# Patient Record
Sex: FEMALE | Race: OTHER | NOT HISPANIC OR LATINO
[De-identification: names, ages, dates, MRNs, and addresses within clinical notes are randomized per-mention and may not be internally consistent; named-entity substitution may affect disease eponyms.]

---

## 2019-03-27 ENCOUNTER — APPOINTMENT (OUTPATIENT)
Dept: BARIATRICS | Facility: CLINIC | Age: 34
End: 2019-03-27
Payer: MEDICAID

## 2019-03-27 VITALS
SYSTOLIC BLOOD PRESSURE: 149 MMHG | BODY MASS INDEX: 43.4 KG/M2 | HEART RATE: 84 BPM | HEIGHT: 69 IN | OXYGEN SATURATION: 97 % | TEMPERATURE: 97.5 F | WEIGHT: 293 LBS | DIASTOLIC BLOOD PRESSURE: 93 MMHG

## 2019-03-27 PROCEDURE — 99203 OFFICE O/P NEW LOW 30 MIN: CPT

## 2019-03-31 NOTE — PHYSICAL EXAM
[Obese, well nourished, in no acute distress] : obese, well nourished, in no acute distress [Normal] : affect appropriate [de-identified] : central obesity

## 2019-03-31 NOTE — HISTORY OF PRESENT ILLNESS
[de-identified] : Patient is a 33 year old female with a long history of morbid obesity not responsive to multiple dietary regimens. Her BMI is 61.6, and her weight related comorbidities include hypertension and GERD.

## 2019-03-31 NOTE — ASSESSMENT
[FreeTextEntry1] : She meets the NIH criteria for bariatric surgery. She would like to have a laparoscopic modified duodenal switch. I have reviewed the risks and benefits of the procedure with the patient, and she understands this information fully.

## 2019-04-17 ENCOUNTER — APPOINTMENT (OUTPATIENT)
Dept: BARIATRICS | Facility: CLINIC | Age: 34
End: 2019-04-17

## 2019-10-16 ENCOUNTER — OUTPATIENT (OUTPATIENT)
Dept: OUTPATIENT SERVICES | Age: 34
LOS: 1 days | Discharge: HOME | End: 2019-10-16

## 2019-10-17 ENCOUNTER — APPOINTMENT (OUTPATIENT)
Dept: PEDIATRIC CARDIOLOGY | Facility: CLINIC | Age: 34
End: 2019-10-17

## 2020-07-02 ENCOUNTER — APPOINTMENT (OUTPATIENT)
Dept: BARIATRICS | Facility: CLINIC | Age: 35
End: 2020-07-02

## 2020-07-16 ENCOUNTER — APPOINTMENT (OUTPATIENT)
Dept: BARIATRICS | Facility: CLINIC | Age: 35
End: 2020-07-16

## 2020-07-29 ENCOUNTER — APPOINTMENT (OUTPATIENT)
Dept: BARIATRICS | Facility: CLINIC | Age: 35
End: 2020-07-29
Payer: COMMERCIAL

## 2020-07-29 VITALS — HEIGHT: 69 IN | WEIGHT: 293 LBS | BODY MASS INDEX: 43.4 KG/M2

## 2020-07-29 PROCEDURE — 99214 OFFICE O/P EST MOD 30 MIN: CPT | Mod: 95

## 2020-08-30 NOTE — REVIEW OF SYSTEMS
[Abdominal Pain] : no abdominal pain [Vomiting] : no vomiting [Constipation] : no constipation [Diarrhea] : no diarrhea [Reflux/Heartburn] : reflux/heartburn [Hernia] : no hernia [Negative] : Endocrine

## 2020-08-30 NOTE — ASSESSMENT
[FreeTextEntry1] : She meets the [facility] criteria for bariatric surgery and would like to have a laparoscopic modified duodenal switch. I have reviewed the risks and benefits of the procedure with patient, and she understands this information fully.

## 2020-08-30 NOTE — HISTORY OF PRESENT ILLNESS
[Home] : at home, [unfilled] , at the time of the visit. [Medical Office: ([facility])___] : at the medical office located in  [Verbal consent obtained from patient] : the patient, [unfilled] [de-identified] : Patient is a 34 year old female with along history of morbid obesity not responsive to multiple dietary regimens. She has a BMI of 62 and weight-related comorbidities including hypertension and GERD.

## 2020-09-03 ENCOUNTER — APPOINTMENT (OUTPATIENT)
Dept: BARIATRICS | Facility: CLINIC | Age: 35
End: 2020-09-03
Payer: COMMERCIAL

## 2020-09-03 VITALS — WEIGHT: 293 LBS | HEIGHT: 69 IN | BODY MASS INDEX: 43.4 KG/M2

## 2020-09-03 PROCEDURE — 97802 MEDICAL NUTRITION INDIV IN: CPT

## 2020-10-02 ENCOUNTER — APPOINTMENT (OUTPATIENT)
Dept: BARIATRICS | Facility: CLINIC | Age: 35
End: 2020-10-02
Payer: COMMERCIAL

## 2020-10-02 VITALS — WEIGHT: 293 LBS | HEIGHT: 69 IN | BODY MASS INDEX: 43.4 KG/M2

## 2020-10-02 PROCEDURE — 97803 MED NUTRITION INDIV SUBSEQ: CPT

## 2020-10-14 ENCOUNTER — RESULT REVIEW (OUTPATIENT)
Age: 35
End: 2020-10-14

## 2020-10-14 ENCOUNTER — APPOINTMENT (OUTPATIENT)
Dept: RADIOLOGY | Facility: HOSPITAL | Age: 35
End: 2020-10-14

## 2020-10-14 ENCOUNTER — OUTPATIENT (OUTPATIENT)
Dept: OUTPATIENT SERVICES | Facility: HOSPITAL | Age: 35
LOS: 1 days | End: 2020-10-14
Payer: COMMERCIAL

## 2020-10-14 PROCEDURE — 74240 X-RAY XM UPR GI TRC 1CNTRST: CPT

## 2020-10-14 PROCEDURE — 74240 X-RAY XM UPR GI TRC 1CNTRST: CPT | Mod: 26

## 2020-11-19 ENCOUNTER — APPOINTMENT (OUTPATIENT)
Dept: BARIATRICS | Facility: CLINIC | Age: 35
End: 2020-11-19
Payer: COMMERCIAL

## 2020-11-19 VITALS — BODY MASS INDEX: 60.84 KG/M2 | WEIGHT: 293 LBS

## 2020-11-19 DIAGNOSIS — I10 ESSENTIAL (PRIMARY) HYPERTENSION: ICD-10-CM

## 2020-11-19 PROCEDURE — 97803 MED NUTRITION INDIV SUBSEQ: CPT

## 2020-12-03 ENCOUNTER — TRANSCRIPTION ENCOUNTER (OUTPATIENT)
Age: 35
End: 2020-12-03

## 2021-01-06 ENCOUNTER — APPOINTMENT (OUTPATIENT)
Dept: BARIATRICS | Facility: CLINIC | Age: 36
End: 2021-01-06
Payer: COMMERCIAL

## 2021-01-06 VITALS — WEIGHT: 293 LBS | BODY MASS INDEX: 60.84 KG/M2

## 2021-01-06 PROCEDURE — 99442: CPT | Mod: 95

## 2021-01-08 ENCOUNTER — LABORATORY RESULT (OUTPATIENT)
Age: 36
End: 2021-01-08

## 2021-01-08 VITALS
RESPIRATION RATE: 16 BRPM | HEIGHT: 71 IN | TEMPERATURE: 98 F | OXYGEN SATURATION: 97 % | DIASTOLIC BLOOD PRESSURE: 82 MMHG | WEIGHT: 293 LBS | HEART RATE: 73 BPM | SYSTOLIC BLOOD PRESSURE: 141 MMHG

## 2021-01-08 NOTE — PRE-OP CHECKLIST - SELECT TESTS ORDERED
CBC/CMP/PT/PTT/INR/Urinalysis/EKG/CXR covid negative, 1/8, Urine HCG negative/CBC/CMP/PT/PTT/INR/Urinalysis/EKG/CXR

## 2021-01-10 ENCOUNTER — TRANSCRIPTION ENCOUNTER (OUTPATIENT)
Age: 36
End: 2021-01-10

## 2021-01-11 ENCOUNTER — RESULT REVIEW (OUTPATIENT)
Age: 36
End: 2021-01-11

## 2021-01-11 ENCOUNTER — INPATIENT (INPATIENT)
Facility: HOSPITAL | Age: 36
LOS: 1 days | Discharge: HOME | DRG: 621 | End: 2021-01-13
Attending: SURGERY | Admitting: SURGERY
Payer: COMMERCIAL

## 2021-01-11 ENCOUNTER — APPOINTMENT (OUTPATIENT)
Dept: BARIATRICS | Facility: HOSPITAL | Age: 36
End: 2021-01-11

## 2021-01-11 DIAGNOSIS — Z98.891 HISTORY OF UTERINE SCAR FROM PREVIOUS SURGERY: Chronic | ICD-10-CM

## 2021-01-11 DIAGNOSIS — Z41.9 ENCOUNTER FOR PROCEDURE FOR PURPOSES OTHER THAN REMEDYING HEALTH STATE, UNSPECIFIED: Chronic | ICD-10-CM

## 2021-01-11 LAB
BASOPHILS # BLD AUTO: 0.01 K/UL — SIGNIFICANT CHANGE UP (ref 0–0.2)
BASOPHILS NFR BLD AUTO: 0.1 % — SIGNIFICANT CHANGE UP (ref 0–2)
BLD GP AB SCN SERPL QL: NEGATIVE — SIGNIFICANT CHANGE UP
EOSINOPHIL # BLD AUTO: 0 K/UL — SIGNIFICANT CHANGE UP (ref 0–0.5)
EOSINOPHIL NFR BLD AUTO: 0 % — SIGNIFICANT CHANGE UP (ref 0–6)
HCT VFR BLD CALC: 40.1 % — SIGNIFICANT CHANGE UP (ref 34.5–45)
HGB BLD-MCNC: 12.1 G/DL — SIGNIFICANT CHANGE UP (ref 11.5–15.5)
IMM GRANULOCYTES NFR BLD AUTO: 0.3 % — SIGNIFICANT CHANGE UP (ref 0–1.5)
LYMPHOCYTES # BLD AUTO: 0.55 K/UL — LOW (ref 1–3.3)
LYMPHOCYTES # BLD AUTO: 4.2 % — LOW (ref 13–44)
MCHC RBC-ENTMCNC: 24.2 PG — LOW (ref 27–34)
MCHC RBC-ENTMCNC: 30.2 GM/DL — LOW (ref 32–36)
MCV RBC AUTO: 80 FL — SIGNIFICANT CHANGE UP (ref 80–100)
MONOCYTES # BLD AUTO: 0.29 K/UL — SIGNIFICANT CHANGE UP (ref 0–0.9)
MONOCYTES NFR BLD AUTO: 2.2 % — SIGNIFICANT CHANGE UP (ref 2–14)
NEUTROPHILS # BLD AUTO: 12.33 K/UL — HIGH (ref 1.8–7.4)
NEUTROPHILS NFR BLD AUTO: 93.2 % — HIGH (ref 43–77)
NRBC # BLD: 0 /100 WBCS — SIGNIFICANT CHANGE UP (ref 0–0)
PLATELET # BLD AUTO: 319 K/UL — SIGNIFICANT CHANGE UP (ref 150–400)
RBC # BLD: 5.01 M/UL — SIGNIFICANT CHANGE UP (ref 3.8–5.2)
RBC # FLD: 15.3 % — HIGH (ref 10.3–14.5)
RH IG SCN BLD-IMP: POSITIVE — SIGNIFICANT CHANGE UP
WBC # BLD: 13.22 K/UL — HIGH (ref 3.8–10.5)
WBC # FLD AUTO: 13.22 K/UL — HIGH (ref 3.8–10.5)

## 2021-01-11 PROCEDURE — 88307 TISSUE EXAM BY PATHOLOGIST: CPT | Mod: 26

## 2021-01-11 PROCEDURE — 43845 GASTROPLASTY DUODENAL SWITCH: CPT | Mod: GC

## 2021-01-11 RX ORDER — LABETALOL HCL 100 MG
200 TABLET ORAL
Refills: 0 | Status: DISCONTINUED | OUTPATIENT
Start: 2021-01-11 | End: 2021-01-13

## 2021-01-11 RX ORDER — ACETAMINOPHEN 500 MG
1000 TABLET ORAL ONCE
Refills: 0 | Status: COMPLETED | OUTPATIENT
Start: 2021-01-11 | End: 2021-01-11

## 2021-01-11 RX ORDER — HYDROMORPHONE HYDROCHLORIDE 2 MG/ML
0.5 INJECTION INTRAMUSCULAR; INTRAVENOUS; SUBCUTANEOUS
Refills: 0 | Status: DISCONTINUED | OUTPATIENT
Start: 2021-01-11 | End: 2021-01-11

## 2021-01-11 RX ORDER — PANTOPRAZOLE SODIUM 20 MG/1
40 TABLET, DELAYED RELEASE ORAL ONCE
Refills: 0 | Status: COMPLETED | OUTPATIENT
Start: 2021-01-11 | End: 2021-01-11

## 2021-01-11 RX ORDER — TERIFLUNOMIDE 7 MG/1
0 TABLET, FILM COATED ORAL
Qty: 0 | Refills: 0 | DISCHARGE

## 2021-01-11 RX ORDER — SCOPALAMINE 1 MG/3D
1 PATCH, EXTENDED RELEASE TRANSDERMAL ONCE
Refills: 0 | Status: COMPLETED | OUTPATIENT
Start: 2021-01-11 | End: 2021-01-11

## 2021-01-11 RX ORDER — ACETAMINOPHEN 500 MG
650 TABLET ORAL EVERY 6 HOURS
Refills: 0 | Status: DISCONTINUED | OUTPATIENT
Start: 2021-01-11 | End: 2021-01-13

## 2021-01-11 RX ORDER — PANTOPRAZOLE SODIUM 20 MG/1
40 TABLET, DELAYED RELEASE ORAL DAILY
Refills: 0 | Status: DISCONTINUED | OUTPATIENT
Start: 2021-01-11 | End: 2021-01-13

## 2021-01-11 RX ORDER — TERIFLUNOMIDE 7 MG/1
1 TABLET, FILM COATED ORAL
Qty: 0 | Refills: 0 | DISCHARGE

## 2021-01-11 RX ORDER — BUPIVACAINE 13.3 MG/ML
20 INJECTION, SUSPENSION, LIPOSOMAL INFILTRATION ONCE
Refills: 0 | Status: DISCONTINUED | OUTPATIENT
Start: 2021-01-11 | End: 2021-01-11

## 2021-01-11 RX ORDER — SODIUM CHLORIDE 9 MG/ML
1000 INJECTION, SOLUTION INTRAVENOUS
Refills: 0 | Status: DISCONTINUED | OUTPATIENT
Start: 2021-01-11 | End: 2021-01-13

## 2021-01-11 RX ORDER — ONDANSETRON 8 MG/1
4 TABLET, FILM COATED ORAL EVERY 6 HOURS
Refills: 0 | Status: DISCONTINUED | OUTPATIENT
Start: 2021-01-11 | End: 2021-01-13

## 2021-01-11 RX ORDER — KETOROLAC TROMETHAMINE 30 MG/ML
15 SYRINGE (ML) INJECTION EVERY 6 HOURS
Refills: 0 | Status: DISCONTINUED | OUTPATIENT
Start: 2021-01-11 | End: 2021-01-11

## 2021-01-11 RX ORDER — GABAPENTIN 400 MG/1
300 CAPSULE ORAL ONCE
Refills: 0 | Status: COMPLETED | OUTPATIENT
Start: 2021-01-11 | End: 2021-01-11

## 2021-01-11 RX ORDER — KETOROLAC TROMETHAMINE 30 MG/ML
15 SYRINGE (ML) INJECTION EVERY 6 HOURS
Refills: 0 | Status: DISCONTINUED | OUTPATIENT
Start: 2021-01-11 | End: 2021-01-13

## 2021-01-11 RX ORDER — ENOXAPARIN SODIUM 100 MG/ML
40 INJECTION SUBCUTANEOUS ONCE
Refills: 0 | Status: COMPLETED | OUTPATIENT
Start: 2021-01-11 | End: 2021-01-11

## 2021-01-11 RX ORDER — ACETAMINOPHEN 500 MG
650 TABLET ORAL EVERY 6 HOURS
Refills: 0 | Status: DISCONTINUED | OUTPATIENT
Start: 2021-01-11 | End: 2021-01-11

## 2021-01-11 RX ORDER — ACETAMINOPHEN 500 MG
1000 TABLET ORAL ONCE
Refills: 0 | Status: COMPLETED | OUTPATIENT
Start: 2021-01-11 | End: 2021-01-12

## 2021-01-11 RX ADMIN — Medication 200 MILLIGRAM(S): at 21:07

## 2021-01-11 RX ADMIN — SCOPALAMINE 1 PATCH: 1 PATCH, EXTENDED RELEASE TRANSDERMAL at 06:55

## 2021-01-11 RX ADMIN — GABAPENTIN 300 MILLIGRAM(S): 400 CAPSULE ORAL at 06:55

## 2021-01-11 RX ADMIN — HYDROMORPHONE HYDROCHLORIDE 0.5 MILLIGRAM(S): 2 INJECTION INTRAMUSCULAR; INTRAVENOUS; SUBCUTANEOUS at 14:07

## 2021-01-11 RX ADMIN — ONDANSETRON 4 MILLIGRAM(S): 8 TABLET, FILM COATED ORAL at 16:50

## 2021-01-11 RX ADMIN — PANTOPRAZOLE SODIUM 40 MILLIGRAM(S): 20 TABLET, DELAYED RELEASE ORAL at 12:12

## 2021-01-11 RX ADMIN — PANTOPRAZOLE SODIUM 40 MILLIGRAM(S): 20 TABLET, DELAYED RELEASE ORAL at 18:49

## 2021-01-11 RX ADMIN — HYDROMORPHONE HYDROCHLORIDE 0.5 MILLIGRAM(S): 2 INJECTION INTRAMUSCULAR; INTRAVENOUS; SUBCUTANEOUS at 22:38

## 2021-01-11 RX ADMIN — Medication 400 MILLIGRAM(S): at 12:15

## 2021-01-11 RX ADMIN — ENOXAPARIN SODIUM 40 MILLIGRAM(S): 100 INJECTION SUBCUTANEOUS at 06:55

## 2021-01-11 RX ADMIN — Medication 1000 MILLIGRAM(S): at 06:55

## 2021-01-11 RX ADMIN — Medication 15 MILLIGRAM(S): at 21:07

## 2021-01-11 RX ADMIN — Medication 400 MILLIGRAM(S): at 19:29

## 2021-01-11 RX ADMIN — SCOPALAMINE 1 PATCH: 1 PATCH, EXTENDED RELEASE TRANSDERMAL at 20:15

## 2021-01-11 NOTE — H&P ADULT - ASSESSMENT
35F with PMH of MO (BMI 62), GERD, HTN, MS, PSH of  x2 presents for elective DS.    Plan  NPO for OR  Post-op bariatric protocol

## 2021-01-11 NOTE — H&P ADULT - HISTORY OF PRESENT ILLNESS
35F with PMH of MO (BMI 62), GERD, HTN, MS, PSH of  x2 presents for elective DS. Pt failed to lose weight with lifestyle and dietary measures. Denies recent travel or sick contacts.

## 2021-01-11 NOTE — BRIEF OPERATIVE NOTE - OPERATION/FINDINGS
Stomach divided along Bougie edge using EndoGIA stapler. Omentum sutured to greater curvature of sleeved stomach. Enterotomies made in duodenal stump & intestine using monopolar scissors. Posterior half of anastomosis sutured laparoscopically. After which anterior half of anastomosis completed with 2-0 PDS. Methylene blue leak test negative for extravasation. Hemostasis achieved. Fascia at 15mm post site closed. Port sites closed w/ 2-0 Vicryl & 4-0 Monocryl sutures & surgical glue.

## 2021-01-11 NOTE — H&P ADULT - NSHPPHYSICALEXAM_GEN_ALL_CORE
GEN: NAD, awake, eyes open spontaneously, generous body habitus  HEENT: NCAT, MMM, Trachea midline, normal conjunctiva, perrl  CHEST/LUNGS: Nonlaboured breathing, CTAB, bilateral breath sounds  CARDIAC: RRR, no m/r/g  ABDOMEN: Soft, Nontender, Nondistended, No rebound/guarding  MSK: No oedema, no gross deformity of extremities  SKIN: No rashes, no petechiae, no vesicles  NEURO: CN grossly intact, normal coordination, no focal motor or sensory deficits  PSYCH: Alert, appropriate, cooperative, with capacity and insight

## 2021-01-12 ENCOUNTER — TRANSCRIPTION ENCOUNTER (OUTPATIENT)
Age: 36
End: 2021-01-12

## 2021-01-12 LAB
ANION GAP SERPL CALC-SCNC: 13 MMOL/L — SIGNIFICANT CHANGE UP (ref 5–17)
BASOPHILS # BLD AUTO: 0.02 K/UL — SIGNIFICANT CHANGE UP (ref 0–0.2)
BASOPHILS NFR BLD AUTO: 0.2 % — SIGNIFICANT CHANGE UP (ref 0–2)
BUN SERPL-MCNC: 11 MG/DL — SIGNIFICANT CHANGE UP (ref 7–23)
CALCIUM SERPL-MCNC: 8.7 MG/DL — SIGNIFICANT CHANGE UP (ref 8.4–10.5)
CHLORIDE SERPL-SCNC: 102 MMOL/L — SIGNIFICANT CHANGE UP (ref 96–108)
CO2 SERPL-SCNC: 24 MMOL/L — SIGNIFICANT CHANGE UP (ref 22–31)
CREAT SERPL-MCNC: 0.94 MG/DL — SIGNIFICANT CHANGE UP (ref 0.5–1.3)
EOSINOPHIL # BLD AUTO: 0 K/UL — SIGNIFICANT CHANGE UP (ref 0–0.5)
EOSINOPHIL NFR BLD AUTO: 0 % — SIGNIFICANT CHANGE UP (ref 0–6)
GLUCOSE SERPL-MCNC: 125 MG/DL — HIGH (ref 70–99)
HCT VFR BLD CALC: 35.7 % — SIGNIFICANT CHANGE UP (ref 34.5–45)
HGB BLD-MCNC: 10.7 G/DL — LOW (ref 11.5–15.5)
IMM GRANULOCYTES NFR BLD AUTO: 0.3 % — SIGNIFICANT CHANGE UP (ref 0–1.5)
LYMPHOCYTES # BLD AUTO: 0.91 K/UL — LOW (ref 1–3.3)
LYMPHOCYTES # BLD AUTO: 10.6 % — LOW (ref 13–44)
MAGNESIUM SERPL-MCNC: 2.1 MG/DL — SIGNIFICANT CHANGE UP (ref 1.6–2.6)
MCHC RBC-ENTMCNC: 23.6 PG — LOW (ref 27–34)
MCHC RBC-ENTMCNC: 30 GM/DL — LOW (ref 32–36)
MCV RBC AUTO: 78.8 FL — LOW (ref 80–100)
MONOCYTES # BLD AUTO: 0.69 K/UL — SIGNIFICANT CHANGE UP (ref 0–0.9)
MONOCYTES NFR BLD AUTO: 8 % — SIGNIFICANT CHANGE UP (ref 2–14)
NEUTROPHILS # BLD AUTO: 6.96 K/UL — SIGNIFICANT CHANGE UP (ref 1.8–7.4)
NEUTROPHILS NFR BLD AUTO: 80.9 % — HIGH (ref 43–77)
NRBC # BLD: 0 /100 WBCS — SIGNIFICANT CHANGE UP (ref 0–0)
PHOSPHATE SERPL-MCNC: 2.8 MG/DL — SIGNIFICANT CHANGE UP (ref 2.5–4.5)
PLATELET # BLD AUTO: 274 K/UL — SIGNIFICANT CHANGE UP (ref 150–400)
POTASSIUM SERPL-MCNC: 3.5 MMOL/L — SIGNIFICANT CHANGE UP (ref 3.5–5.3)
POTASSIUM SERPL-SCNC: 3.5 MMOL/L — SIGNIFICANT CHANGE UP (ref 3.5–5.3)
RBC # BLD: 4.53 M/UL — SIGNIFICANT CHANGE UP (ref 3.8–5.2)
RBC # FLD: 15.4 % — HIGH (ref 10.3–14.5)
SODIUM SERPL-SCNC: 139 MMOL/L — SIGNIFICANT CHANGE UP (ref 135–145)
WBC # BLD: 8.61 K/UL — SIGNIFICANT CHANGE UP (ref 3.8–10.5)
WBC # FLD AUTO: 8.61 K/UL — SIGNIFICANT CHANGE UP (ref 3.8–10.5)

## 2021-01-12 RX ORDER — ACETAMINOPHEN 500 MG
2 TABLET ORAL
Qty: 28 | Refills: 0
Start: 2021-01-12 | End: 2021-01-18

## 2021-01-12 RX ORDER — OMEPRAZOLE 10 MG/1
1 CAPSULE, DELAYED RELEASE ORAL
Qty: 30 | Refills: 0
Start: 2021-01-12 | End: 2021-02-10

## 2021-01-12 RX ORDER — LABETALOL HCL 100 MG
1 TABLET ORAL
Qty: 0 | Refills: 0 | DISCHARGE

## 2021-01-12 RX ORDER — SODIUM CHLORIDE 9 MG/ML
500 INJECTION, SOLUTION INTRAVENOUS ONCE
Refills: 0 | Status: COMPLETED | OUTPATIENT
Start: 2021-01-12 | End: 2021-01-12

## 2021-01-12 RX ORDER — HYDROMORPHONE HYDROCHLORIDE 2 MG/ML
0.25 INJECTION INTRAMUSCULAR; INTRAVENOUS; SUBCUTANEOUS ONCE
Refills: 0 | Status: DISCONTINUED | OUTPATIENT
Start: 2021-01-12 | End: 2021-01-12

## 2021-01-12 RX ORDER — HEPARIN SODIUM 5000 [USP'U]/ML
5000 INJECTION INTRAVENOUS; SUBCUTANEOUS EVERY 8 HOURS
Refills: 0 | Status: DISCONTINUED | OUTPATIENT
Start: 2021-01-12 | End: 2021-01-13

## 2021-01-12 RX ORDER — HYDROCHLOROTHIAZIDE 25 MG
25 TABLET ORAL
Qty: 0 | Refills: 0 | DISCHARGE

## 2021-01-12 RX ORDER — SODIUM CHLORIDE 9 MG/ML
1000 INJECTION, SOLUTION INTRAVENOUS ONCE
Refills: 0 | Status: COMPLETED | OUTPATIENT
Start: 2021-01-12 | End: 2021-01-12

## 2021-01-12 RX ORDER — POTASSIUM CHLORIDE 20 MEQ
40 PACKET (EA) ORAL ONCE
Refills: 0 | Status: COMPLETED | OUTPATIENT
Start: 2021-01-12 | End: 2021-01-12

## 2021-01-12 RX ORDER — ACETAMINOPHEN 500 MG
1000 TABLET ORAL ONCE
Refills: 0 | Status: DISCONTINUED | OUTPATIENT
Start: 2021-01-12 | End: 2021-01-13

## 2021-01-12 RX ORDER — APIXABAN 2.5 MG/1
1 TABLET, FILM COATED ORAL
Qty: 60 | Refills: 0
Start: 2021-01-12 | End: 2021-02-10

## 2021-01-12 RX ADMIN — HEPARIN SODIUM 5000 UNIT(S): 5000 INJECTION INTRAVENOUS; SUBCUTANEOUS at 22:06

## 2021-01-12 RX ADMIN — Medication 200 MILLIGRAM(S): at 17:47

## 2021-01-12 RX ADMIN — Medication 15 MILLIGRAM(S): at 22:06

## 2021-01-12 RX ADMIN — Medication 15 MILLIGRAM(S): at 05:17

## 2021-01-12 RX ADMIN — SCOPALAMINE 1 PATCH: 1 PATCH, EXTENDED RELEASE TRANSDERMAL at 06:07

## 2021-01-12 RX ADMIN — SCOPALAMINE 1 PATCH: 1 PATCH, EXTENDED RELEASE TRANSDERMAL at 19:00

## 2021-01-12 RX ADMIN — Medication 400 MILLIGRAM(S): at 05:17

## 2021-01-12 RX ADMIN — Medication 40 MILLIEQUIVALENT(S): at 10:13

## 2021-01-12 RX ADMIN — HYDROMORPHONE HYDROCHLORIDE 0.25 MILLIGRAM(S): 2 INJECTION INTRAMUSCULAR; INTRAVENOUS; SUBCUTANEOUS at 10:10

## 2021-01-12 RX ADMIN — HEPARIN SODIUM 5000 UNIT(S): 5000 INJECTION INTRAVENOUS; SUBCUTANEOUS at 14:36

## 2021-01-12 RX ADMIN — ONDANSETRON 4 MILLIGRAM(S): 8 TABLET, FILM COATED ORAL at 10:16

## 2021-01-12 RX ADMIN — ONDANSETRON 4 MILLIGRAM(S): 8 TABLET, FILM COATED ORAL at 04:49

## 2021-01-12 RX ADMIN — Medication 15 MILLIGRAM(S): at 12:52

## 2021-01-12 RX ADMIN — SODIUM CHLORIDE 500 MILLILITER(S): 9 INJECTION, SOLUTION INTRAVENOUS at 14:35

## 2021-01-12 RX ADMIN — PANTOPRAZOLE SODIUM 40 MILLIGRAM(S): 20 TABLET, DELAYED RELEASE ORAL at 12:51

## 2021-01-12 RX ADMIN — SODIUM CHLORIDE 1000 MILLILITER(S): 9 INJECTION, SOLUTION INTRAVENOUS at 07:45

## 2021-01-12 RX ADMIN — Medication 200 MILLIGRAM(S): at 05:17

## 2021-01-12 RX ADMIN — Medication 15 MILLIGRAM(S): at 17:45

## 2021-01-12 NOTE — DISCHARGE NOTE PROVIDER - NSDCCPTREATMENT_GEN_ALL_CORE_FT
PRINCIPAL PROCEDURE  Procedure: Laparoscopic loop duodenal switch  Findings and Treatment: Warning Signs:  Please call your doctor or nurse practitioner if you experience the following:  *You experience new chest pain, pressure, squeezing or tightness.  *New or worsening cough, shortness of breath, or wheeze.  *If you are vomiting and cannot keep down fluids or your medications.  *You are getting dehydrated due to continued vomiting, diarrhea, or other reasons. Signs of dehydration include dry mouth, rapid heartbeat, or feeling dizzy or faint when standing.  *You see blood or dark/black material when you vomit or have a bowel movement.  *You experience burning when you urinate, have blood in your urine, or experience a discharge.  *Your pain is not improving within 8-12 hours or is not gone within 24 hours. Call or return immediately if your pain is getting worse, changes location, or moves to your chest or back.  *You have shaking chills, or fever greater than 101.5 degrees Fahrenheit or 38 degrees Celsius.  *Any change in your symptoms, or any new symptoms that concern you.

## 2021-01-12 NOTE — DISCHARGE NOTE PROVIDER - HOSPITAL COURSE
36 yo F w/ PMH of MO (BMI 62), GERD, HTN, MS, PSH of  x2 presents for elective DS. The patient failed to lose weight with lifestyle and dietary measures. Denies recent travel or sick contacts.    : Laparoscopic loop duodenal switch, the patient tolerated the procedure well    Since the surgery the patient was slowly advanced to BCLD, which she tolerated w/o nausea or vomiting.    On the day of discharge the patient was having adequate bowel function and urination and was stable to be discharged home.   35 year old female with PMH of MO (BMI 62), GERD, HTN, MS, pshx  of  x2  s/p Laparoscopic loop duodenal switch 21.  Pt tolerated the procedure well. At time of discharge, pt was tolerating a bariatric clear liquid diet, and pt's pain was controlled. Plan is to follow up with Dr. Billy in the office.

## 2021-01-12 NOTE — DISCHARGE NOTE PROVIDER - NSDCMRMEDTOKEN_GEN_ALL_CORE_FT
Aubagio 14 mg oral tablet: 1 tab(s) orally once a day  Eliquis 2.5 mg oral tablet: 1 tab(s) orally 2 times a day   omeprazole 40 mg oral delayed release capsule: 1 cap(s) orally once a day   Tylenol 325 mg oral tablet: 2 tab(s) orally 2 times a day, As Needed -for severe pain

## 2021-01-12 NOTE — PROGRESS NOTE ADULT - ASSESSMENT
35F with PMH of MO (BMI 62), GERD, HTN, MS, PSH of  x2 presents for elective DS. Pt failed to lose weight with lifestyle and dietary measures. Denies recent travel or sick contacts. Now s/p Laparoscopic Loop Duodenal Switch ().    BCLD/IVF  Pain and Nausea control  Protonix  CBC 6 hours post-op  OOBA/IS  SCDs  Nutrition consult in AM  encourage PO intake, poss dc this afternoon

## 2021-01-12 NOTE — DIETITIAN INITIAL EVALUATION ADULT. - OTHER CALCULATIONS
Ideal body weight (70.3kg) used for calculations as pt >120% of IBW (263%). Nutrient needs based on [facility] standards of care for maintenance in adults. Needs adjusted for s/p duodenal switch. Weeks 1-2 estimated nutrient needs above, >/=64oz clear fluids. Estimated energy needs for wt maintenance 1406-1757kcal/day (20-25kcal/kg)

## 2021-01-12 NOTE — DISCHARGE NOTE PROVIDER - NSDCFUADDINST_GEN_ALL_CORE_FT
Follow up with  in 1 week. Call the office at  to schedule your appointment.     You may shower; soap and water over incision sites. Do not scrub. Pat dry when done. No tub bathing or swimming until cleared.     Keep incision sites out of the sun as scars will darken.     No heavy lifting (>10lbs) or strenuous exercise.    Diet: Bariatric Full Fluids. 60 grams protein daily.  64 fluid ounces water daily. Drink small sips throughout the day. Continue diet as outlined by paperwork received as a pre-operative patient.     You should be urinating at least 3-4x per day.     Call the office if you experience increasing abdominal pain, nausea, vomiting, or temperature >100.4F.      NO ASPIRIN OR NSAIDs until approved by Dr. Billy.     Avoid alcoholic beverages until cleared by Dr. iBlly.  Follow up with  in 1 week. Call the office at  to schedule your appointment.  You may shower; soap and water over incision sites. Do not scrub. Pat dry when done. No tub bathing or swimming until cleared. Keep incision sites out of the sun as scars will darken. No heavy lifting (>10lbs) or strenuous exercise. Diet: Bariatric Full Fluids. 60 grams protein daily.  64 fluid ounces water daily. Drink small sips throughout the day. Continue diet as outlined by paperwork received as a pre-operative patient. You should be urinating at least 3-4x per day. Call the office if you experience increasing abdominal pain, nausea, vomiting, or temperature >100.4F.  NO ASPIRIN OR NSAIDs until approved by Dr. Billy. Avoid alcoholic beverages until cleared by Dr. Billy.    1) Please take Tylenol 650 mg every 4 to 6 hours by mouth for moderate to severe pain control. Please do not exceed over 4,000 mg of Tylenol a day.   2) Please take Omeprazole 40 mg once a day by mouth.  3) Please start taking Eliquis 2.5 mg by mouth twice a day starting Thursday January 14, 2021 for 28 days.

## 2021-01-12 NOTE — DIETITIAN INITIAL EVALUATION ADULT. - OTHER INFO
35F with PMH of MO (BMI 62), GERD, HTN, MS, PSH of  x2 presents for elective DS. Now POD1 s/p Laparoscopic Loop Duodenal Switch (). Pt seen in room, resting in bed. Currently on BARICLLIQ diet and tolerating PO. Pt just started consuming 1oz fluids every 15 minutes. Discussed volume sizes of cups of tray table. Pt reports nausea and vomiting has improved. Pt noted to be ambulating per EMR. Prepared w/ appropriate protein/vitamin supplements except V B12. RD provided in-depth edu on diet advancement process and specific nutrient needs s/p DS. Pt receptive and expressed understanding. Allergy to shellfish noted in EMR. Skin: surgical incision; GI: abdominal discomfort per flowsheet. RD to follow.

## 2021-01-12 NOTE — DISCHARGE NOTE PROVIDER - NSDCCPCAREPLAN_GEN_ALL_CORE_FT
PRINCIPAL DISCHARGE DIAGNOSIS  Diagnosis: Morbid obesity  Assessment and Plan of Treatment:       SECONDARY DISCHARGE DIAGNOSES  Diagnosis: H/O:   Assessment and Plan of Treatment: x2    Diagnosis: Multiple sclerosis  Assessment and Plan of Treatment:     Diagnosis: HTN (hypertension)  Assessment and Plan of Treatment:     Diagnosis: GERD (gastroesophageal reflux disease)  Assessment and Plan of Treatment:      PRINCIPAL DISCHARGE DIAGNOSIS  Diagnosis: Morbid obesity  Assessment and Plan of Treatment: 35 year old female with PMH of MO (BMI 62), GERD, HTN, MS, pshx  of  x2  s/p Laparoscopic loop duodenal switch 21.  Pt tolerated the procedure well. At time of discharge, pt was tolerating a bariatric clear liquid diet, and pt's pain was controlled. Plan is to follow up with Dr. Billy in the office.  1) Please take Tylenol 650 mg every 4 to 6 hours by mouth for moderate to severe pain control. Please do not exceed over 4,000 mg of Tylenol a day.   2) Please take Omeprazole 40 mg once a day by mouth.  3) Please start taking Eliquis 2.5 mg by mouth twice a day starting  for 28 days.        SECONDARY DISCHARGE DIAGNOSES  Diagnosis: H/O:   Assessment and Plan of Treatment: x2    Diagnosis: Multiple sclerosis  Assessment and Plan of Treatment:     Diagnosis: HTN (hypertension)  Assessment and Plan of Treatment:     Diagnosis: GERD (gastroesophageal reflux disease)  Assessment and Plan of Treatment:

## 2021-01-12 NOTE — DIETITIAN INITIAL EVALUATION ADULT. - PERSON TAUGHT/METHOD
Educated on bariatric diet progression, importance of adequate protein, hydration, and compliance with vitamin supplements. Pt expressed positive, verbal understanding; expected compliance is good./verbal instruction/written material/patient instructed

## 2021-01-12 NOTE — DISCHARGE NOTE PROVIDER - NSDCACTIVITY_GEN_ALL_CORE
Showering allowed/Walking - Indoors allowed/No heavy lifting/straining/Walking - Outdoors allowed Showering allowed/Stairs allowed/Walking - Indoors allowed/No heavy lifting/straining/Walking - Outdoors allowed

## 2021-01-13 ENCOUNTER — TRANSCRIPTION ENCOUNTER (OUTPATIENT)
Age: 36
End: 2021-01-13

## 2021-01-13 VITALS
DIASTOLIC BLOOD PRESSURE: 75 MMHG | OXYGEN SATURATION: 96 % | TEMPERATURE: 99 F | HEART RATE: 71 BPM | RESPIRATION RATE: 16 BRPM | SYSTOLIC BLOOD PRESSURE: 129 MMHG

## 2021-01-13 LAB
ANION GAP SERPL CALC-SCNC: 11 MMOL/L — SIGNIFICANT CHANGE UP (ref 5–17)
BUN SERPL-MCNC: 9 MG/DL — SIGNIFICANT CHANGE UP (ref 7–23)
CALCIUM SERPL-MCNC: 8.4 MG/DL — SIGNIFICANT CHANGE UP (ref 8.4–10.5)
CHLORIDE SERPL-SCNC: 102 MMOL/L — SIGNIFICANT CHANGE UP (ref 96–108)
CO2 SERPL-SCNC: 25 MMOL/L — SIGNIFICANT CHANGE UP (ref 22–31)
CREAT SERPL-MCNC: 0.92 MG/DL — SIGNIFICANT CHANGE UP (ref 0.5–1.3)
GLUCOSE SERPL-MCNC: 106 MG/DL — HIGH (ref 70–99)
HCT VFR BLD CALC: 33.6 % — LOW (ref 34.5–45)
HGB BLD-MCNC: 10.1 G/DL — LOW (ref 11.5–15.5)
MAGNESIUM SERPL-MCNC: 2 MG/DL — SIGNIFICANT CHANGE UP (ref 1.6–2.6)
MCHC RBC-ENTMCNC: 23.9 PG — LOW (ref 27–34)
MCHC RBC-ENTMCNC: 30.1 GM/DL — LOW (ref 32–36)
MCV RBC AUTO: 79.4 FL — LOW (ref 80–100)
NRBC # BLD: 0 /100 WBCS — SIGNIFICANT CHANGE UP (ref 0–0)
PHOSPHATE SERPL-MCNC: 2 MG/DL — LOW (ref 2.5–4.5)
PLATELET # BLD AUTO: 241 K/UL — SIGNIFICANT CHANGE UP (ref 150–400)
POTASSIUM SERPL-MCNC: 3.5 MMOL/L — SIGNIFICANT CHANGE UP (ref 3.5–5.3)
POTASSIUM SERPL-SCNC: 3.5 MMOL/L — SIGNIFICANT CHANGE UP (ref 3.5–5.3)
RBC # BLD: 4.23 M/UL — SIGNIFICANT CHANGE UP (ref 3.8–5.2)
RBC # FLD: 15.6 % — HIGH (ref 10.3–14.5)
SODIUM SERPL-SCNC: 138 MMOL/L — SIGNIFICANT CHANGE UP (ref 135–145)
SURGICAL PATHOLOGY STUDY: SIGNIFICANT CHANGE UP
WBC # BLD: 6.73 K/UL — SIGNIFICANT CHANGE UP (ref 3.8–10.5)
WBC # FLD AUTO: 6.73 K/UL — SIGNIFICANT CHANGE UP (ref 3.8–10.5)

## 2021-01-13 PROCEDURE — 80048 BASIC METABOLIC PNL TOTAL CA: CPT

## 2021-01-13 PROCEDURE — 84100 ASSAY OF PHOSPHORUS: CPT

## 2021-01-13 PROCEDURE — C1889: CPT

## 2021-01-13 PROCEDURE — 83735 ASSAY OF MAGNESIUM: CPT

## 2021-01-13 PROCEDURE — 86901 BLOOD TYPING SEROLOGIC RH(D): CPT

## 2021-01-13 PROCEDURE — 85027 COMPLETE CBC AUTOMATED: CPT

## 2021-01-13 PROCEDURE — 36415 COLL VENOUS BLD VENIPUNCTURE: CPT

## 2021-01-13 PROCEDURE — 86850 RBC ANTIBODY SCREEN: CPT

## 2021-01-13 PROCEDURE — S2900: CPT

## 2021-01-13 PROCEDURE — 85025 COMPLETE CBC W/AUTO DIFF WBC: CPT

## 2021-01-13 PROCEDURE — 88307 TISSUE EXAM BY PATHOLOGIST: CPT

## 2021-01-13 PROCEDURE — 86900 BLOOD TYPING SEROLOGIC ABO: CPT

## 2021-01-13 RX ORDER — POTASSIUM CHLORIDE 20 MEQ
40 PACKET (EA) ORAL ONCE
Refills: 0 | Status: COMPLETED | OUTPATIENT
Start: 2021-01-13 | End: 2021-01-13

## 2021-01-13 RX ORDER — SODIUM,POTASSIUM PHOSPHATES 278-250MG
1 POWDER IN PACKET (EA) ORAL ONCE
Refills: 0 | Status: COMPLETED | OUTPATIENT
Start: 2021-01-13 | End: 2021-01-13

## 2021-01-13 RX ADMIN — Medication 1 PACKET(S): at 11:42

## 2021-01-13 RX ADMIN — Medication 15 MILLIGRAM(S): at 11:43

## 2021-01-13 RX ADMIN — SCOPALAMINE 1 PATCH: 1 PATCH, EXTENDED RELEASE TRANSDERMAL at 07:22

## 2021-01-13 RX ADMIN — Medication 15 MILLIGRAM(S): at 05:04

## 2021-01-13 RX ADMIN — HEPARIN SODIUM 5000 UNIT(S): 5000 INJECTION INTRAVENOUS; SUBCUTANEOUS at 05:04

## 2021-01-13 RX ADMIN — Medication 40 MILLIEQUIVALENT(S): at 09:00

## 2021-01-13 RX ADMIN — PANTOPRAZOLE SODIUM 40 MILLIGRAM(S): 20 TABLET, DELAYED RELEASE ORAL at 11:43

## 2021-01-13 RX ADMIN — Medication 200 MILLIGRAM(S): at 05:04

## 2021-01-13 NOTE — PROGRESS NOTE ADULT - ASSESSMENT
35F with PMH of MO (BMI 62), GERD, HTN, MS, PSH of  x2 presents for elective DS. Pt failed to lose weight with lifestyle and dietary measures. Denies recent travel or sick contacts. Now s/p Laparoscopic Loop Duodenal Switch ().    BCLD/IVF  Pain and Nausea control  Protonix  OOBA/IS  SCDs, SQH for DVT ppx.  dc this afternoon

## 2021-01-13 NOTE — DISCHARGE NOTE NURSING/CASE MANAGEMENT/SOCIAL WORK - PATIENT PORTAL LINK FT
You can access the FollowMyHealth Patient Portal offered by [facility] by registering at the following website: [email or web address]. By joining [employer]’s FollowMyHealth portal, you will also be able to view your health information using other applications (apps) compatible with our system.

## 2021-01-13 NOTE — PROGRESS NOTE ADULT - SUBJECTIVE AND OBJECTIVE BOX
POST-OPERATIVE NOTE    Procedure: Laparoscopic Loop Duodenal Switch    Diagnosis/Indication: Morbid Obesity    Surgeon: Dr. Billy    S: The patient denies having any complaints other than gas pain. She was examined at the bedside. The patient denies having chest pain, SOB, dizziness, chills, nausea, vomiting. Her pain is well-controlled w/ medications.    O:  T(C): 37.3 (21 @ 11:12), Max: 37.3 (21 @ 11:12)  T(F): 99.1 (21 @ 11:12), Max: 99.1 (21 @ 11:12)  HR: 70 (21 @ :47) (70 - 80)  BP: 135/70 (21 @ 12:47) (115/66 - 141/72)  RR: 17 (21 @ :47) (16 - 31)  SpO2: 97% (21 @ :47) (95% - 100%)  Wt(kg): --            Gen: NAD, resting comfortably in bed  C/V: NSR, S1, S2 present  Pulm: Nonlabored breathing, no respiratory distress  Abd: soft, ND, appropriately TTP at the incision sites, no rebound tenderness, guarding, rigidity, incisions C/D/I  Neuro: AAOx3      A/P: 36 yo Female with PMH of MO (BMI 62), GERD, HTN, MS, PSH of  x2 presents for elective DS. She failed to lose weight with lifestyle and dietary measures. Denies recent travel or sick contacts. Now s/p Laparoscopic Loop Duodenal Switch ().    BCLD/IVF  Pain and Nausea control  Protonix  CBC 6 hours post-op  OOBA/IS  SCDs  Nutrition consult in AM    
INTERVAL HPI/OVERNIGHT EVENTS:    STATUS POST:      POST OPERATIVE DAY #:     SUBJECTIVE:  Flatus: [] YES [] NO             Bowel Movement: [ ] YES [ ] NO  Pain (0-10):            Pain Control Adequate: [ ] YES [ ] NO  Nausea: [ ] YES [ ] NO            Vomiting: [ ] YES [ ] NO  Diarrhea: [ ] YES [ ] NO         Constipation: [ ] YES [ ] NO     Chest Pain: [ ] YES [ ] NO    SOB:  [ ] YES [ ] NO    heparin   Injectable 5000 Unit(s) SubCutaneous every 8 hours  labetalol 200 milliGRAM(s) Oral two times a day      Vital Signs Last 24 Hrs  T(C): 36.9 (13 Jan 2021 05:05), Max: 36.9 (12 Jan 2021 20:20)  T(F): 98.5 (13 Jan 2021 05:05), Max: 98.5 (13 Jan 2021 05:05)  HR: 81 (13 Jan 2021 05:05) (70 - 81)  BP: 136/78 (13 Jan 2021 05:05) (100/68 - 147/80)  BP(mean): 102 (12 Jan 2021 16:26) (102 - 102)  RR: 18 (13 Jan 2021 05:05) (17 - 18)  SpO2: 95% (13 Jan 2021 05:05) (95% - 99%)  I&O's Detail    11 Jan 2021 07:01  -  12 Jan 2021 07:00  --------------------------------------------------------  IN:    IV PiggyBack: 100 mL    Lactated Ringers: 2880 mL  Total IN: 2980 mL    OUT:    Emesis (mL): 100 mL    Voided (mL): 275 mL  Total OUT: 375 mL    Total NET: 2605 mL      12 Jan 2021 07:01  -  13 Jan 2021 06:15  --------------------------------------------------------  IN:    IV PiggyBack: 1500 mL    Lactated Ringers: 1900 mL    Oral Fluid: 480 mL  Total IN: 3880 mL    OUT:    Voided (mL): 1500 mL  Total OUT: 1500 mL    Total NET: 2380 mL          General: NAD, resting comfortably in bed  C/V: NSR  Pulm: Nonlabored breathing, no respiratory distress  Abd:   Extrem: WWP, no edema, SCDs in place        LABS:                        10.7   8.61  )-----------( 274      ( 12 Jan 2021 06:50 )             35.7     01-12    139  |  102  |  11  ----------------------------<  125<H>  3.5   |  24  |  0.94    Ca    8.7      12 Jan 2021 06:50  Phos  2.8     01-12  Mg     2.1     01-12          
INTERVAL HPI/OVERNIGHT EVENTS: VALERIE    STATUS POST:  laparoscopic loop duodenal switch    POST OPERATIVE DAY #: 2    SUBJECTIVE:  pt seen at bedside, feels good. tolerating liquids. denies nausea/vomiting    MEDICATIONS  (STANDING):  acetaminophen  IVPB .. 1000 milliGRAM(s) IV Intermittent once  heparin   Injectable 5000 Unit(s) SubCutaneous every 8 hours  ketorolac   Injectable 15 milliGRAM(s) IV Push every 6 hours  labetalol 200 milliGRAM(s) Oral two times a day  lactated ringers. 1000 milliLiter(s) (100 mL/Hr) IV Continuous <Continuous>  pantoprazole  Injectable 40 milliGRAM(s) IV Push daily  teriflunomide (Aubagio) 14 milliGRAM(s) 14 milliGRAM(s) Oral daily    MEDICATIONS  (PRN):  acetaminophen   Tablet .. 650 milliGRAM(s) Oral every 6 hours PRN Mild Pain (1 - 3)  ondansetron Injectable 4 milliGRAM(s) IV Push every 6 hours PRN Nausea      Vital Signs Last 24 Hrs  T(C): 36.9 (13 Jan 2021 05:05), Max: 36.9 (12 Jan 2021 20:20)  T(F): 98.5 (13 Jan 2021 05:05), Max: 98.5 (13 Jan 2021 05:05)  HR: 81 (13 Jan 2021 05:05) (70 - 81)  BP: 136/78 (13 Jan 2021 05:05) (100/68 - 147/80)  BP(mean): 102 (12 Jan 2021 16:26) (102 - 102)  RR: 18 (13 Jan 2021 05:05) (17 - 18)  SpO2: 95% (13 Jan 2021 05:05) (95% - 99%)    PHYSICAL EXAM:      Constitutional: A&Ox3    Respiratory: non labored breathing, no respiratory distress    Cardiovascular: NSR, RRR    Gastrointestinal: soft, nondistended, mild incisional tenderness, incisions c/d/i    Extremities: (-) edema                  I&O's Detail    12 Jan 2021 07:01  -  13 Jan 2021 07:00  --------------------------------------------------------  IN:    IV PiggyBack: 1500 mL    Lactated Ringers: 1900 mL    Oral Fluid: 480 mL  Total IN: 3880 mL    OUT:    Voided (mL): 1500 mL  Total OUT: 1500 mL    Total NET: 2380 mL          LABS:                        10.1   6.73  )-----------( 241      ( 13 Jan 2021 06:22 )             33.6     01-13    138  |  102  |  9   ----------------------------<  106<H>  3.5   |  25  |  0.92    Ca    8.4      13 Jan 2021 06:22  Phos  2.0     01-13  Mg     2.0     01-13            RADIOLOGY & ADDITIONAL STUDIES:
INTERVAL HPI/OVERNIGHT EVENTS: post op h/h: 12.1/40.1, 1 episode of emesis, Protonix, added home labetolol for sbp 174, passed TOV, pt flushed first urine, emesis x 1    STATUS POST:  Laparoscopic loop duodenal switch    POST OPERATIVE DAY #: 1    SUBJECTIVE:  pt seen at bedside, feeling better. nausea improved. denies further episodes of vomiting. ambulating. tolerating sips of liquids    MEDICATIONS  (STANDING):  acetaminophen  IVPB .. 1000 milliGRAM(s) IV Intermittent once  ketorolac   Injectable 15 milliGRAM(s) IV Push every 6 hours  labetalol 200 milliGRAM(s) Oral two times a day  lactated ringers. 1000 milliLiter(s) (160 mL/Hr) IV Continuous <Continuous>  pantoprazole  Injectable 40 milliGRAM(s) IV Push daily  teriflunomide (Aubagio) 14 milliGRAM(s) 14 milliGRAM(s) Oral daily    MEDICATIONS  (PRN):  acetaminophen   Tablet .. 650 milliGRAM(s) Oral every 6 hours PRN Mild Pain (1 - 3)  ondansetron Injectable 4 milliGRAM(s) IV Push every 6 hours PRN Nausea      Vital Signs Last 24 Hrs  T(C): 36.3 (12 Jan 2021 05:00), Max: 37.3 (11 Jan 2021 11:12)  T(F): 97.3 (12 Jan 2021 05:00), Max: 99.1 (11 Jan 2021 11:12)  HR: 78 (12 Jan 2021 05:00) (70 - 80)  BP: 148/99 (12 Jan 2021 05:00) (115/66 - 174/105)  BP(mean): 115 (12 Jan 2021 05:00) (86 - 115)  RR: 17 (12 Jan 2021 05:00) (16 - 31)  SpO2: 100% (12 Jan 2021 05:00) (95% - 100%)    PHYSICAL EXAM:      Constitutional: A&Ox3    Respiratory: non labored breathing, no respiratory distress    Cardiovascular: NSR, RRR    Gastrointestinal: soft, nondistended, mild incisional tenderness, incisions c/d/i    Extremities: (-) edema                  I&O's Detail    11 Jan 2021 07:01  -  12 Jan 2021 06:53  --------------------------------------------------------  IN:    IV PiggyBack: 100 mL    Lactated Ringers: 2880 mL  Total IN: 2980 mL    OUT:    Emesis (mL): 100 mL    Voided (mL): 275 mL  Total OUT: 375 mL    Total NET: 2605 mL          LABS:                        12.1   13.22 )-----------( 319      ( 11 Jan 2021 17:45 )             40.1                 RADIOLOGY & ADDITIONAL STUDIES:

## 2021-01-13 NOTE — PROGRESS NOTE ADULT - ASSESSMENT
35F with PMH of MO (BMI 62), GERD, HTN, MS, PSH of  x2 presents for elective DS. Pt failed to lose weight with lifestyle and dietary measures. Denies recent travel or sick contacts. Now s/p Laparoscopic Loop Duodenal Switch ().    BCLD/IVF  Pain and Nausea control  Protonix  OOBA/IS  SCDs, SQH for DVT ppx.

## 2021-01-14 PROBLEM — I10 ESSENTIAL (PRIMARY) HYPERTENSION: Chronic | Status: ACTIVE | Noted: 2021-01-08

## 2021-01-14 PROBLEM — E66.9 OBESITY, UNSPECIFIED: Chronic | Status: ACTIVE | Noted: 2021-01-08

## 2021-01-14 PROBLEM — G35 MULTIPLE SCLEROSIS: Chronic | Status: ACTIVE | Noted: 2021-01-08

## 2021-01-15 ENCOUNTER — NON-APPOINTMENT (OUTPATIENT)
Age: 36
End: 2021-01-15

## 2021-01-17 DIAGNOSIS — I10 ESSENTIAL (PRIMARY) HYPERTENSION: ICD-10-CM

## 2021-01-17 DIAGNOSIS — E66.01 MORBID (SEVERE) OBESITY DUE TO EXCESS CALORIES: ICD-10-CM

## 2021-01-17 DIAGNOSIS — K21.9 GASTRO-ESOPHAGEAL REFLUX DISEASE WITHOUT ESOPHAGITIS: ICD-10-CM

## 2021-01-17 DIAGNOSIS — Z91.013 ALLERGY TO SEAFOOD: ICD-10-CM

## 2021-01-17 DIAGNOSIS — G35 MULTIPLE SCLEROSIS: ICD-10-CM

## 2021-01-20 ENCOUNTER — FORM ENCOUNTER (OUTPATIENT)
Age: 36
End: 2021-01-20

## 2021-01-21 ENCOUNTER — APPOINTMENT (OUTPATIENT)
Dept: BARIATRICS | Facility: CLINIC | Age: 36
End: 2021-01-21
Payer: COMMERCIAL

## 2021-01-21 VITALS — HEIGHT: 69 IN | BODY MASS INDEX: 43.4 KG/M2 | WEIGHT: 293 LBS

## 2021-01-21 PROCEDURE — 99024 POSTOP FOLLOW-UP VISIT: CPT

## 2021-01-21 NOTE — ASSESSMENT
[FreeTextEntry1] : 35 yr old female s/p MDS on 1/11. Doing well, tolerating stage II diet well and taking daily vitamins and medications.

## 2021-01-21 NOTE — PLAN
[FreeTextEntry1] : Advanced diet as per guidelines\par Discussed introducing exercise, no heavy lifting until 4 weeks postop\par Discussed importance of drinking protein shakes and getting 80-100g of protein daily \par Will f/u with our dietician\par F/u in 4-6 weeks or as needed

## 2021-01-21 NOTE — HISTORY OF PRESENT ILLNESS
[Home] : at home, [unfilled] , at the time of the visit. [Medical Office: ([facility])___] : at the medical office located in  [Verbal consent obtained from patient] : the patient, [unfilled] [de-identified] : Ms. Ledesma returns to see us today for a postoperative visit s/p MDS on 1/11. She reports she's doing well, down 18lbs. Denies pain, n/v, fevers or PO intolerances. Tolerating stage II diet well and improving her fluid intake every day. She is walking for activity and taking daily vitamins and medications.

## 2021-02-24 ENCOUNTER — APPOINTMENT (OUTPATIENT)
Dept: BARIATRICS | Facility: CLINIC | Age: 36
End: 2021-02-24
Payer: COMMERCIAL

## 2021-02-24 VITALS
HEART RATE: 78 BPM | TEMPERATURE: 97.1 F | OXYGEN SATURATION: 100 % | WEIGHT: 293 LBS | SYSTOLIC BLOOD PRESSURE: 146 MMHG | DIASTOLIC BLOOD PRESSURE: 91 MMHG | BODY MASS INDEX: 43.4 KG/M2 | HEIGHT: 69 IN

## 2021-02-24 DIAGNOSIS — K21.9 GASTRO-ESOPHAGEAL REFLUX DISEASE W/OUT ESOPHAGITIS: ICD-10-CM

## 2021-02-24 DIAGNOSIS — K91.2 POSTSURGICAL MALABSORPTION, NOT ELSEWHERE CLASSIFIED: ICD-10-CM

## 2021-02-24 PROCEDURE — 99024 POSTOP FOLLOW-UP VISIT: CPT

## 2021-02-24 NOTE — HISTORY OF PRESENT ILLNESS
[de-identified] : Patient is 6 weeks s/p her SIPS and is doing well. She states that she is struggling getting in the required amount of protein. She is not drinking her protein shakes because she says that she can not tolerate them. She is taking her vitamins and walking for exercise.

## 2021-02-24 NOTE — ASSESSMENT
[FreeTextEntry1] : She was told to try different brands and flavors of protein shakes to find one that works for her. She was encouraged to start strength training. Omeprazole was prescribed.

## 2021-02-28 NOTE — HISTORY OF PRESENT ILLNESS
[Home] : at home, [unfilled] , at the time of the visit. [Medical Office: ([facility])___] : at the medical office located in  [Verbal consent obtained from patient] : the patient, [unfilled] [de-identified] : Patient is scheduled for a laparoscopic modified duodenal switch. She has been medically optimized for her operation. she has lost 5 pounds since her consultation. Her upper GI study shows no abnormalities.

## 2021-07-29 ENCOUNTER — APPOINTMENT (OUTPATIENT)
Dept: BARIATRICS | Facility: CLINIC | Age: 36
End: 2021-07-29
Payer: COMMERCIAL

## 2021-07-29 VITALS — HEIGHT: 69 IN | WEIGHT: 279 LBS | BODY MASS INDEX: 41.32 KG/M2

## 2021-07-29 DIAGNOSIS — E66.01 MORBID (SEVERE) OBESITY DUE TO EXCESS CALORIES: ICD-10-CM

## 2021-07-29 PROCEDURE — 99212 OFFICE O/P EST SF 10 MIN: CPT | Mod: 95

## 2021-07-29 NOTE — ASSESSMENT
[FreeTextEntry1] : 35 year old female having a normal course s/p MDS 6 months ago. Her height has been documented incorrectly, she reports she is 5 ft 11in with a current weight of 279lbs putting her at a BMI of 38 now. She is doing well with no complains, tolerating a regular diet, meeting protein requirements, compliant with vitamins and exercising.

## 2021-07-29 NOTE — HISTORY OF PRESENT ILLNESS
[Home] : at home, [unfilled] , at the time of the visit. [Medical Office: ([facility])___] : at the medical office located in  [Verbal consent obtained from patient] : the patient, [unfilled] [de-identified] : Ms. Ledesma returns to see us today for a follow up visit 6 months s/p MDS. Pt reports she is doing well, denies pain, n/v, heartburn or PO intolerance. Tolerating a regular diet well, seems to be having adequate protein intake with meats, eggs, fish, yogurt and sometimes supplements with protein shakes. She is having about 48-52oz of fluid daily and is compliant with daily vitamin supplementation. She walks 2-3 1/2 miles 5-6x a week and is beginning to do home workouts. Discussed getting weights and resistance bands to incorporate strength training in her workouts. She did blood work with her doctor, based on the levels she stated labs seem to be stable. Prealbumin 21, total protein 7.0, albumin 4.0, A1C 5.1%, Hgb 11.4, no vitamin deficiencies as per patient.

## 2021-07-29 NOTE — PLAN
[FreeTextEntry1] : Incorporate strength and resistance training in home workouts\par Increase fluid intake to 64oz\par Send labs to our office\par Follow up in 3 months

## 2021-08-04 ENCOUNTER — NON-APPOINTMENT (OUTPATIENT)
Age: 36
End: 2021-08-04

## 2021-10-06 PROBLEM — I10 ESSENTIAL HYPERTENSION: Status: ACTIVE | Noted: 2019-03-27

## 2021-10-15 RX ORDER — OMEPRAZOLE 40 MG/1
40 CAPSULE, DELAYED RELEASE ORAL
Qty: 30 | Refills: 3 | Status: ACTIVE | COMMUNITY
Start: 2021-02-24 | End: 1900-01-01

## 2022-06-28 NOTE — DISCHARGE NOTE PROVIDER - NSDCCPGOAL_GEN_ALL_CORE_FT
Would like her PT done at [facility]    thanks To get better and follow your care plan as instructed. To get better and follow your care plan as instructed.  1) Please take Tylenol 650 mg every 4 to 6 hours by mouth for moderate to severe pain control. Please do not exceed over 4,000 mg of Tylenol a day.   2) Please take Omeprazole 40 mg once a day by mouth.  3) Please start taking Eliquis 2.5 mg by mouth twice a day starting Thursday January 14, 2021 for 28 days.

## 2022-08-02 ENCOUNTER — NON-APPOINTMENT (OUTPATIENT)
Age: 37
End: 2022-08-02

## 2022-11-30 NOTE — DISCHARGE NOTE PROVIDER - NSDCDCMDCOMP_GEN_ALL_CORE
This document is complete and the patient is ready for discharge. Hydroquinone Counseling:  Patient advised that medication may result in skin irritation, lightening (hypopigmentation), dryness, and burning.  In the event of skin irritation, the patient was advised to reduce the amount of the drug applied or use it less frequently.  Rarely, spots that are treated with hydroquinone can become darker (pseudoochronosis).  Should this occur, patient instructed to stop medication and call the office. The patient verbalized understanding of the proper use and possible adverse effects of hydroquinone.  All of the patient's questions and concerns were addressed.

## 2023-04-12 NOTE — DISCHARGE NOTE PROVIDER - CARE PROVIDER_API CALL
Fabián Billy (MD)  Surgery  [address]  [place], [place] [address]  Phone: [phone]  Fax: [phone]  Follow Up Time:    Yes

## 2024-04-25 ENCOUNTER — APPOINTMENT (OUTPATIENT)
Dept: BARIATRICS | Facility: CLINIC | Age: 39
End: 2024-04-25
Payer: COMMERCIAL

## 2024-04-25 VITALS
HEART RATE: 91 BPM | HEIGHT: 71 IN | TEMPERATURE: 97.3 F | SYSTOLIC BLOOD PRESSURE: 144 MMHG | WEIGHT: 230 LBS | DIASTOLIC BLOOD PRESSURE: 81 MMHG | BODY MASS INDEX: 32.2 KG/M2 | OXYGEN SATURATION: 99 %

## 2024-04-25 DIAGNOSIS — Z82.49 FAMILY HISTORY OF ISCHEMIC HEART DISEASE AND OTHER DISEASES OF THE CIRCULATORY SYSTEM: ICD-10-CM

## 2024-04-25 DIAGNOSIS — Z83.49 FAMILY HISTORY OF OTHER ENDOCRINE, NUTRITIONAL AND METABOLIC DISEASES: ICD-10-CM

## 2024-04-25 DIAGNOSIS — Z84.1 FAMILY HISTORY OF DISORDERS OF KIDNEY AND URETER: ICD-10-CM

## 2024-04-25 PROCEDURE — 99204 OFFICE O/P NEW MOD 45 MIN: CPT

## 2024-04-25 RX ORDER — LOSARTAN POTASSIUM 100 MG/1
TABLET, FILM COATED ORAL
Refills: 0 | Status: ACTIVE | COMMUNITY

## 2024-04-25 RX ORDER — TERIFLUNOMIDE 7 MG/1
TABLET, FILM COATED ORAL
Refills: 0 | Status: ACTIVE | COMMUNITY

## 2024-04-29 NOTE — END OF VISIT
[Time Spent: ___ minutes] : I have spent [unfilled] minutes of time on the encounter. [FreeTextEntry3] :  All medical record entries made by the Scribe were at my, MABEL Trejo , direction and personally dictated by me on 04/25/2024 . I have reviewed the chart and agree that the record accurately reflects my personal performance of the history, physical exam, assessment and plan. I have also personally directed, reviewed, and agreed with the chart.

## 2024-04-29 NOTE — ASSESSMENT
[FreeTextEntry1] : Pt is a 39 y/o F 3 years s/p MDS with Dr. Billy who presents today for a follow up. Patient reports to be doing really well overall and is continuing to consume adequate protein. denies n/v/c/d/ SOB,  po intolerance, chest pain, abd pain, dizziness, fever and calf pain. She does have acid reflux which has been controlled wit PPI but has not been scoped since surgery. Will refer to GI for surveillance EGD. States taking a bariatric multivitamin and showed a photo of the vitamin she is currently taking. Advised to take twice daily and patient prefers to follow up with hematology for possible iron infusions. Will review other vitamins with dietician today. States she is physically active, doing cardio and strength training. Has lost over 200 lbs since surgery. Notes a rash forming in the abdominal skin fold following weight loss and would like to consult plastic surgery for which we will provide her a referral for. Patient showed recent labs done by her PCP which only showed significant findings for iron deficiency anemia. Will sent patient for further vitamin labs today.

## 2024-04-29 NOTE — PLAN
[FreeTextEntry1] : - Refer to GI, plastics, and hematology - nutritionist today - labs  - f/u in 3 months

## 2024-04-29 NOTE — HISTORY OF PRESENT ILLNESS
[de-identified] : Pt is a 37 y/o F 3 years s/p MDS with Dr. Billy who presents today for a follow up. Patient reports to be doing really well overall and is continuing to consume adequate protein. denies n/v/c/d/ SOB,  po intolerance, chest pain, abd pain, dizziness, fever and calf pain. She does have acid reflux which has been controlled wit PPI but has not been scoped since surgery. Will refer to GI for surveillance EGD. States taking a bariatric multivitamin and showed a photo of the vitamin she is currently taking. Advised to take twice daily and patient prefers to follow up with hematology for possible iron infusions. Will review other vitamins with dietician today. States she is physically active, doing cardio and strength training. Has lost over 200 lbs since surgery. Notes a rash forming in the abdominal skin fold following weight loss and would like to consult plastic surgery for which we will provide her a referral for. Patient showed recent labs done by her PCP which only showed significant findings for iron deficiency anemia. Will sent patient for further vitamin labs today.

## 2024-05-20 ENCOUNTER — NON-APPOINTMENT (OUTPATIENT)
Age: 39
End: 2024-05-20

## 2024-05-20 DIAGNOSIS — Z98.84 BARIATRIC SURGERY STATUS: ICD-10-CM

## 2024-05-20 DIAGNOSIS — R79.89 OTHER SPECIFIED ABNORMAL FINDINGS OF BLOOD CHEMISTRY: ICD-10-CM

## 2024-05-20 DIAGNOSIS — Z00.00 ENCOUNTER FOR GENERAL ADULT MEDICAL EXAMINATION W/OUT ABNORMAL FINDINGS: ICD-10-CM

## 2024-05-20 LAB
25(OH)D3 SERPL-MCNC: 43.8 NG/ML
A-TOCOPHEROL VIT E SERPL-MCNC: 8.6 MG/L
BETA+GAMMA TOCOPHEROL SERPL-MCNC: 0.6 MG/L
CALCIUM SERPL-MCNC: 8.9 MG/DL
CHOLEST SERPL-MCNC: 175 MG/DL
HDLC SERPL-MCNC: 79 MG/DL
LDLC SERPL CALC-MCNC: 74 MG/DL
NONHDLC SERPL-MCNC: 97 MG/DL
PARATHYROID HORMONE INTACT: 146 PG/ML
PREALB SERPL NEPH-MCNC: 28 MG/DL
TRIGL SERPL-MCNC: 132 MG/DL
VIT A SERPL-MCNC: 56.4 UG/DL
VIT B1 SERPL-MCNC: 107.9 NMOL/L
ZINC SERPL-MCNC: 56 UG/DL

## 2024-08-01 ENCOUNTER — APPOINTMENT (OUTPATIENT)
Dept: GASTROENTEROLOGY | Facility: CLINIC | Age: 39
End: 2024-08-01

## 2024-08-21 ENCOUNTER — APPOINTMENT (OUTPATIENT)
Dept: ENDOCRINOLOGY | Facility: CLINIC | Age: 39
End: 2024-08-21